# Patient Record
Sex: FEMALE | HISPANIC OR LATINO | Employment: FULL TIME | URBAN - METROPOLITAN AREA
[De-identification: names, ages, dates, MRNs, and addresses within clinical notes are randomized per-mention and may not be internally consistent; named-entity substitution may affect disease eponyms.]

---
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
## 2023-03-27 NOTE — ED TRIAGE NOTES
"Chief Complaint   Patient presents with    Sore Throat     Onset early yesterday morning.     Shortness of Breath     Onset this morning. Hx asthma, does have inhaler at home but did not use. \"Hard to take a deep breath, I feel like I have phlegm in my chest\", denies cough or fevers.     Chest Pain     Onset yesterday; central chest pain 8/10 \"pressure\"     /68   Pulse 85   Temp 37.4 °C (99.4 °F) (Temporal)   Resp 18   Ht 1.676 m (5' 6\")   Wt 82.2 kg (181 lb 3.5 oz)   LMP  (Within Weeks)   SpO2 99%   BMI 29.25 kg/m²     Pt AO4, amb to triage w steady gait.    " Hot flashes and fatigue are the main problem.  Estrogen gave her headaches so we will try a low dose fluoxetine to mitigate symptoms

## 2023-07-10 ENCOUNTER — NON-PROVIDER VISIT (OUTPATIENT)
Dept: OCCUPATIONAL MEDICINE | Facility: CLINIC | Age: 18
End: 2023-07-10

## 2023-07-10 DIAGNOSIS — Z11.1 ENCOUNTER FOR PPD TEST: Primary | ICD-10-CM

## 2023-07-10 PROCEDURE — 86580 TB INTRADERMAL TEST: CPT | Performed by: NURSE PRACTITIONER

## 2023-07-10 NOTE — PROGRESS NOTES
PPD Placed. Explained that she has a 48 to 72 window to come back and get it read. Patient stated it was understood and took a copy of the tb instructions.

## 2023-07-12 ENCOUNTER — NON-PROVIDER VISIT (OUTPATIENT)
Dept: OCCUPATIONAL MEDICINE | Facility: CLINIC | Age: 18
End: 2023-07-12

## 2023-07-12 LAB — TB WHEAL 3D P 5 TU DIAM: NORMAL MM

## 2023-12-04 NOTE — LETTER
29-Nov-2023 22:07 29-Nov-2023 23:02 June 29, 2022         Patient: Ashwini Hilliard   YOB: 2005   Date of Visit: 6/29/2022           To Whom it May Concern:    Ashwini Hilliard was seen in my clinic on 6/29/2022. She may return to work on 06/30/2022.    If you have any questions or concerns, please don't hesitate to call.        Sincerely,           KACIE Johnson.  Electronically Signed      04-Dec-2023 10:30 30-Nov-2023 16:14